# Patient Record
Sex: MALE | Race: WHITE | NOT HISPANIC OR LATINO | URBAN - NONMETROPOLITAN AREA
[De-identification: names, ages, dates, MRNs, and addresses within clinical notes are randomized per-mention and may not be internally consistent; named-entity substitution may affect disease eponyms.]

---

## 2016-09-29 PROBLEM — H02.834: Noted: 2019-05-20

## 2016-09-29 PROBLEM — H02.831: Noted: 2019-05-20

## 2016-09-29 PROBLEM — H18.51: Noted: 2019-05-20

## 2016-09-29 PROBLEM — H35.371: Noted: 2019-05-20

## 2017-09-07 PROBLEM — H01.021: Noted: 2018-09-18

## 2017-09-07 PROBLEM — H18.51: Noted: 2017-09-07

## 2019-01-21 ENCOUNTER — IMPORTED ENCOUNTER (OUTPATIENT)
Dept: URBAN - NONMETROPOLITAN AREA CLINIC 1 | Facility: CLINIC | Age: 74
End: 2019-01-21

## 2019-01-21 PROCEDURE — 92083 EXTENDED VISUAL FIELD XM: CPT

## 2019-01-21 PROCEDURE — 99213 OFFICE O/P EST LOW 20 MIN: CPT

## 2019-01-21 NOTE — PATIENT DISCUSSION
POAG OU- Discussed diagnosis in detail with patient- IOP today 20 OU- Cup to Disc noted at OD . 45 and OS . 39- OCT done previously shows no progression of RNF loss by GPA- VF done today Good field normal with no defects OU- Gonio done previously Baseline Grade IV with light pigment- Optos done previously stable OU - iStents not visable on todays exam- D/C  Xalatan QHS OU continue off of drops at this time. Will continue to monitor. If any change in pressure or OCT will consider putting back on drops - Continue to monitor  Pseudophakia OU- Discussed diagnosis in detail with patient- Doing well s/ p PC IOL OU- Continue to monitorPVD OU- Discussed findings of exam in detail with the patient. - The risk of retinal detachment in patients with PVDs was discussed with the patient and the warning signs of retinal detachment were carefully reviewed with the patient. - The patient was warned to return to the office or contact the ophthalmologist on call immediately if they experience signs of retinal detachment. - Continue to monitorDermatochalsis OU- Discussed diagnosis in detail with patient- No superior field of vision loss- Continue to monitorBlepharitis OU- Discussed diagnosis in detail with patient- Recommend patient using J & J baby shampoo to scrub lid daily- Continue to monitorDES / Guttata OU- Discussed diagnosis in detail with patient- Recommend drinking plenty of water and starting Omega 3's - Recommend Refresh or systane through out the day- Continue to monitor; 's Notes: OCT 5/10/18gonio 5/10/18VF 1/21/19Optos 9/18/18

## 2019-05-20 ENCOUNTER — IMPORTED ENCOUNTER (OUTPATIENT)
Dept: URBAN - NONMETROPOLITAN AREA CLINIC 1 | Facility: CLINIC | Age: 74
End: 2019-05-20

## 2019-05-20 PROCEDURE — 92014 COMPRE OPH EXAM EST PT 1/>: CPT

## 2019-05-20 PROCEDURE — 92133 CPTRZD OPH DX IMG PST SGM ON: CPT

## 2019-05-20 NOTE — PATIENT DISCUSSION
POAG OU- Discussed diagnosis in detail with patient- IOP today 17 OD and 16 OS- Cup to Disc noted at OD 0.45 and OS 0.45- OCT done previously shows no progression of RNF loss by GPA- VF done today Good field normal with no defects OU- Gonio done previously Baseline Grade IV with light pigment- Optos done previously stable OU - iStents visible and stable on exam today- D/C  Xalatan QHS OU continue off of drops at this time. Will continue to monitor. If any change in pressure or OCT will consider putting back on drops - Continue to monitor  Pseudophakia OU- Discussed diagnosis in detail with patient- Doing well s/ p PC IOL OU- MR done today due to mild [facility] complaint today new GLRx given but not required to use. - Currently using +3.50 OTC bifocal readers and is happy with [facility]. - Continue to monitorERM OD-  Discussed findings w/ pt today-  New on exam today-  AG given and instructions on use. Call or come in ASAP if changes in vision are noted from today. -  Continue to monitor PRN-  Will get baseline OCT of macula next visit. PVD OU- Discussed findings of exam in detail with the patient. - The risk of retinal detachment in patients with PVDs was discussed with the patient and the warning signs of retinal detachment were carefully reviewed with the patient. - The patient was warned to return to the office or contact the ophthalmologist on call immediately if they experience signs of retinal detachment. - Continue to monitorDermatochalsis OU- Discussed diagnosis in detail with patient- No superior field of vision loss- Continue to monitorBlepharitis OU- Discussed diagnosis in detail with patient- Recommend patient using J & J baby shampoo to scrub lid daily reminded him of importance again today- Continue to monitorDES / Guttata OU- Discussed diagnosis in detail with patient- Recommend drinking plenty of water and starting Omega 3's - Recommend Refresh or systane through out the day- Continue to monitor; 's Notes:  - AOA (LRI OU)DFE OCT disc 5/20/19OCT mac Gonio 5/10/18VF 1/21/19Optos 9/18/18

## 2019-09-20 ENCOUNTER — IMPORTED ENCOUNTER (OUTPATIENT)
Dept: URBAN - NONMETROPOLITAN AREA CLINIC 1 | Facility: CLINIC | Age: 74
End: 2019-09-20

## 2019-09-20 PROBLEM — H02.831: Noted: 2019-05-20

## 2019-09-20 PROBLEM — H18.51: Noted: 2019-09-20

## 2019-09-20 PROBLEM — H40.1131: Noted: 2020-01-10

## 2019-09-20 PROBLEM — H43.813: Noted: 2019-09-20

## 2019-09-20 PROBLEM — Z96.1: Noted: 2019-09-20

## 2019-09-20 PROBLEM — H02.834: Noted: 2019-05-20

## 2019-09-20 PROBLEM — H35.371: Noted: 2020-01-10

## 2019-09-20 PROBLEM — H40.1131: Noted: 2019-09-20

## 2019-09-20 PROBLEM — H18.513: Noted: 2021-07-20

## 2019-09-20 PROBLEM — H35.371: Noted: 2019-09-20

## 2019-09-20 PROCEDURE — 92014 COMPRE OPH EXAM EST PT 1/>: CPT

## 2019-09-20 PROCEDURE — 92134 CPTRZ OPH DX IMG PST SGM RTA: CPT

## 2019-09-20 NOTE — PATIENT DISCUSSION
ERM OD - Discussed diagnosis in detail with patient- Recommend patient start following the [address] patient to call or come into the office ASAP if any changes noted from today- OCT done today shows OD ERM and OS WNL - Continue to monitor - RTC 4 months F/U POAG with Optos and GonioPOAG OU- Discussed diagnosis in detail with patient- IOP today 18 OU- Cup to Disc noted at OD 0.45 and OS 0.45- OCT done previously shows no progression of RNF loss by GPA- VF done previously Good field normal with no defects OU- Gonio done previously Baseline Grade IV with light pigment- Optos done previously stable OU - iStents visible and stable on exam today- D/C  Xalatan QHS OU continue off of drops at this time. Will continue to monitor. If any change in pressure or OCT will consider putting back on drops. Pressures are up today but patient does not want to start back on drops at this time - Continue to monitor  Pseudophakia OU- Discussed diagnosis in detail with patient- S/P YAG PC OU - Good central opening - Currently using +3.50 OTC bifocal readers and is happy with [facility]. - Continue to monitorPVD OU- Discussed findings of exam in detail with the patient. - The risk of retinal detachment in patients with PVDs was discussed with the patient and the warning signs of retinal detachment were carefully reviewed with the patient. - The patient was warned to return to the office or contact the ophthalmologist on call immediately if they experience signs of retinal detachment. - Continue to monitorDermatochalsis OU- Discussed diagnosis in detail with patient- No superior field of vision loss- Continue to monitorBlepharitis OU- Discussed diagnosis in detail with patient- Recommend patient using J [employer] J baby shampoo to scrub lid daily reminded him of importance again today- Continue to monitorDES / Guttata OU- Discussed diagnosis in detail with patient- Recommend drinking plenty of water and starting Omega 3's - Recommend Refresh or systane through out the day- Continue to monitor; 's Notes: MR - AOA (LRI OU)DFE OCT disc 5/20/19OCT mac 9/20/19Gonio 5/10/18VF 1/21/19Optos 9/18/18

## 2020-01-10 ENCOUNTER — IMPORTED ENCOUNTER (OUTPATIENT)
Dept: URBAN - NONMETROPOLITAN AREA CLINIC 1 | Facility: CLINIC | Age: 75
End: 2020-01-10

## 2020-01-10 PROCEDURE — 92250 FUNDUS PHOTOGRAPHY W/I&R: CPT

## 2020-01-10 PROCEDURE — 92014 COMPRE OPH EXAM EST PT 1/>: CPT

## 2020-01-10 PROCEDURE — 92020 GONIOSCOPY: CPT

## 2020-01-10 NOTE — PATIENT DISCUSSION
ERM OD - Discussed diagnosis in detail with patient- Recommend patient start following the [address] patient to call or come into the office ASAP if any changes noted from today- OCT done today shows OD ERM and OS WNL - Continue to monitor - RTC 4 months F/U POAG with Optos and GonioPOAG OU- Discussed diagnosis in detail with patient- IOP today 18 OD and 19 OS. - Cup to Disc noted at OD 0.45 and OS 0.45- OCT done previously shows no progression of RNF loss by GPA- VF done previously Good field normal with no defects OU- Gonio done today: Grade IV with light pigment stable OU. - Optos done today:  Stable OU - iStents visible and stable on exam today- Continue without Xalatan QHS OU will continue to monitor. If any change in pressure or OCT will consider putting back on drops. Pressures are up today but patient does not want to start back on drops at this time. - Continue to monitor  Pseudophakia OU- Discussed diagnosis in detail with patient- S/P YAG PC OU - Good central opening - Currently using +3.50 OTC bifocal readers and is happy with [facility]. - Continue to monitorPVD OU- Discussed findings of exam in detail with the patient. - The risk of retinal detachment in patients with PVDs was discussed with the patient and the warning signs of retinal detachment were carefully reviewed with the patient. - The patient was warned to return to the office or contact the ophthalmologist on call immediately if they experience signs of retinal detachment. - Continue to monitorDermatochalsis OU- Discussed diagnosis in detail with patient- No superior field of vision loss- Continue to monitorBlepharitis OU- Discussed diagnosis in detail with patient- Recommend patient using J [employer] J baby shampoo to scrub lid daily reminded him of importance again today- Continue to monitorDES / Guttata OU- Discussed diagnosis in detail with patient- Recommend drinking plenty of water and starting Omega 3's - Recommend Refresh or systane through out the day- Continue to monitor; 's Notes: MR - AOA (LRI OU)DFE OCT disc 5/20/19OCT mac 9/20/19VF 1/21/19Gonio 1/10/20Optos 1/10/20

## 2020-05-12 ENCOUNTER — IMPORTED ENCOUNTER (OUTPATIENT)
Dept: URBAN - NONMETROPOLITAN AREA CLINIC 1 | Facility: CLINIC | Age: 75
End: 2020-05-12

## 2020-05-12 PROCEDURE — 99213 OFFICE O/P EST LOW 20 MIN: CPT

## 2020-05-12 NOTE — PATIENT DISCUSSION
POAG OU- Discussed diagnosis in detail with patient- IOP today 18 OU- Cup to Disc noted at OD 0.45 and OS 0.45- OCT done previously shows no progression of RNF loss by GPA- VF done previously Good field normal with no defects OU- Gonio done previously Grade IV with light pigment stable OU. - Optos done previously Stable OU - iStents visible and stable on exam today- Continue without Xalatan QHS OU will continue to monitor. If any change in pressure or OCT will consider putting back on drops. Pressures are up today but patient does not want to start back on drops at this time. - Continue to monitorERM OD - Discussed diagnosis in detail with patient- Recommend patient start following the [address] patient to call or come into the office ASAP if any changes noted from today- OCT done previously shows OD ERM and OS WNL - Continue to monitor Pseudophakia OU- Discussed diagnosis in detail with patient- S/P YAG PC OU - Good central opening - Currently using +3.50 OTC bifocal readers and is happy with [facility]. - Continue to monitorPVD OU- Discussed findings of exam in detail with the patient. - The risk of retinal detachment in patients with PVDs was discussed with the patient and the warning signs of retinal detachment were carefully reviewed with the patient. - The patient was warned to return to the office or contact the ophthalmologist on call immediately if they experience signs of retinal detachment. - Continue to monitorDermatochalsis OU- Discussed diagnosis in detail with patient- No superior field of vision loss- Continue to monitorBlepharitis OU- Discussed diagnosis in detail with patient- Recommend patient using J & J baby shampoo to scrub lid daily reminded him of importance again today- Continue to monitorDES / Guttata OU- Discussed diagnosis in detail with patient- Recommend drinking plenty of water and starting Omega 3's - Recommend Refresh or systane through out the day- Continue to monitor; 's Notes: MR - AOA (LRI OU)DFE OCT disc 5/20/19OCT mac 9/20/19VF 1/21/19Gonio 1/10/20Optos 1/10/20

## 2020-09-15 ENCOUNTER — IMPORTED ENCOUNTER (OUTPATIENT)
Dept: URBAN - NONMETROPOLITAN AREA CLINIC 1 | Facility: CLINIC | Age: 75
End: 2020-09-15

## 2020-09-15 PROCEDURE — 99213 OFFICE O/P EST LOW 20 MIN: CPT

## 2020-09-15 PROCEDURE — 92083 EXTENDED VISUAL FIELD XM: CPT

## 2020-09-15 NOTE — PATIENT DISCUSSION
POAG OU- Discussed diagnosis in detail with patient- IOP today 18 OU- Cup to Disc noted at OD 0.45 and OS 0.45- OCT done previously shows no progression of RNF loss by GPA- VF done today OD shows Fair field with Borderline Nasal Superior Arcuate and OS shows Fair field and Normal - Gonio done previously Grade IV with light pigment stable OU. - Optos done previously Stable OU - iStents not visible on exam today- Continue without Xalatan QHS OU will continue to monitor. If any change in pressure or OCT will consider putting back on drops. - Continue to monitorERM OD - Discussed diagnosis in detail with patient- Recommend patient start following the [address] patient to call or come into the office ASAP if any changes noted from today- OCT done previously shows OD ERM and OS WNL - Continue to monitor Pseudophakia OU- Discussed diagnosis in detail with patient- S/P YAG PC OU - Good central opening - Currently using +3.50 OTC bifocal readers and is happy with [facility]. - Continue to monitorPVD OU- Discussed findings of exam in detail with the patient. - The risk of retinal detachment in patients with PVDs was discussed with the patient and the warning signs of retinal detachment were carefully reviewed with the patient. - The patient was warned to return to the office or contact the ophthalmologist on call immediately if they experience signs of retinal detachment. - Continue to monitorDermatochalsis OU- Discussed diagnosis in detail with patient- No superior field of vision loss- Continue to monitorBlepharitis OU- Discussed diagnosis in detail with patient- Recommend patient using J [employer] J baby shampoo to scrub lid daily reminded him of importance again today- Continue to monitorDES / Guttata OU- Discussed diagnosis in detail with patient- Recommend drinking plenty of water and starting Omega 3's - Recommend Refresh or systane through out the day- Continue to monitor; 's Notes:  - AOA (LRI OU)DFE OCT disc 5/20/19OCT mac 9/20/19VF 9/15/20Gonio 1/10/20Optos 1/10/20

## 2021-01-15 ENCOUNTER — IMPORTED ENCOUNTER (OUTPATIENT)
Dept: URBAN - NONMETROPOLITAN AREA CLINIC 1 | Facility: CLINIC | Age: 76
End: 2021-01-15

## 2021-01-15 PROCEDURE — 92133 CPTRZD OPH DX IMG PST SGM ON: CPT

## 2021-01-15 PROCEDURE — 92014 COMPRE OPH EXAM EST PT 1/>: CPT

## 2021-01-15 NOTE — PATIENT DISCUSSION
POAG OU- Discussed diagnosis in detail with patient- IOP today 17 OU- Cup to Disc noted at OD 0.45 and OS 0.45- OCT done today shows no progression of RNF loss by GPA- VF done previously OD shows Fair field with Borderline Nasal Superior Arcuate and OS shows Fair field and Normal - Gonio done previously Grade IV with light pigment stable OU. - Optos done previously Stable OU - iStents not visible on exam today- Continue without Xalatan QHS OU will continue to monitor. If any change in pressure or OCT will consider putting back on drops. - Continue to monitorERM OD - Discussed diagnosis in detail with patient- Recommend patient start following the [address] patient to call or come into the office ASAP if any changes noted from today- OCT done previously shows OD ERM and OS WNL - Continue to monitor Pseudophakia OU- Discussed diagnosis in detail with patient- S/P YAG PC OU - Good central opening - Currently using +3.50 OTC bifocal readers and is happy with [facility]. - Continue to monitorPVD OU- Discussed findings of exam in detail with the patient. - The risk of retinal detachment in patients with PVDs was discussed with the patient and the warning signs of retinal detachment were carefully reviewed with the patient. - The patient was warned to return to the office or contact the ophthalmologist on call immediately if they experience signs of retinal detachment. - Continue to monitorDermatochalsis OU- Discussed diagnosis in detail with patient- No superior field of vision loss- Continue to monitorBlepharitis OU- Discussed diagnosis in detail with patient- Recommend patient using J [employer] J baby shampoo to scrub lid daily reminded him of importance again today- Continue to monitorDES / Guttata OU- Discussed diagnosis in detail with patient- Recommend drinking plenty of water and starting Omega 3's - Recommend Refresh or systane through out the day- Continue to monitor; 's Notes:  - AOA (LRI OU)DFE OCT disc 1/15/21OCT mac 9/20/19VF 9/15/20Gonio 1/10/20Optos 1/10/20

## 2021-07-20 ENCOUNTER — IMPORTED ENCOUNTER (OUTPATIENT)
Dept: URBAN - NONMETROPOLITAN AREA CLINIC 1 | Facility: CLINIC | Age: 76
End: 2021-07-20

## 2021-07-20 PROCEDURE — 99214 OFFICE O/P EST MOD 30 MIN: CPT

## 2021-07-20 PROCEDURE — 92083 EXTENDED VISUAL FIELD XM: CPT

## 2021-07-20 NOTE — PATIENT DISCUSSION
POAG OU- Discussed diagnosis in detail with patient- IOP today 16 OU- Cup to Disc noted at OD 0.45 and OS 0.45- OCT done previously shows no progression of RNF loss by GPA- VF done today OD shows Poor field with Non Specific Defects and Os shows Fair field with Inferior Arcuate  - Gonio done previously Grade IV with light pigment stable OU. - Optos done previously Stable OU - iStents not visible on exam today- Continue without Xalatan QHS OU will continue to monitor. If any change in pressure or OCT will consider putting back on drops. - Continue to monitorERM OD - Discussed diagnosis in detail with patient- Recommend patient start following the [address] patient to call or come into the office ASAP if any changes noted from today- OCT done previously shows OD ERM and OS WNL - Continue to monitor Pseudophakia OU- Discussed diagnosis in detail with patient- S/P YAG PC OU - Good central opening - Currently using +3.50 OTC bifocal readers and is happy with [facility]. - Continue to monitorPVD OU- Discussed findings of exam in detail with the patient. - The risk of retinal detachment in patients with PVDs was discussed with the patient and the warning signs of retinal detachment were carefully reviewed with the patient. - The patient was warned to return to the office or contact the ophthalmologist on call immediately if they experience signs of retinal detachment. - Continue to monitorDermatochalsis OU- Discussed diagnosis in detail with patient- No superior field of vision loss- Continue to monitorBlepharitis OU- Discussed diagnosis in detail with patient- Recommend patient using J [employer] J baby shampoo to scrub lid daily reminded him of importance again today- Continue to monitorDES / Guttata OU- Discussed diagnosis in detail with patient- Recommend drinking plenty of water and starting Omega 3's - Recommend Refresh or systane through out the day- Continue to monitorPresbyopia OU - Discussed diagnosis in detail with patient - New glasses RX given today - Continue to monitor; 's Notes: MR - AOA (LRI OU)DFE OCT disc 1/15/21OCT mac 9/20/19VF 7/20/21Gonio 1/10/20Optos 1/10/20

## 2022-03-01 ASSESSMENT — VISUAL ACUITY
OD_PH: 20/30-2
OD_SC: 20/60
OS_SC: 20/30-2

## 2022-03-03 ENCOUNTER — PREPPED CHART (OUTPATIENT)
Dept: URBAN - NONMETROPOLITAN AREA CLINIC 1 | Facility: CLINIC | Age: 77
End: 2022-03-03

## 2022-03-03 ENCOUNTER — FOLLOW UP (OUTPATIENT)
Dept: URBAN - NONMETROPOLITAN AREA CLINIC 1 | Facility: CLINIC | Age: 77
End: 2022-03-03

## 2022-03-03 DIAGNOSIS — H40.1131: ICD-10-CM

## 2022-03-03 DIAGNOSIS — H35.371: ICD-10-CM

## 2022-03-03 PROCEDURE — 92133 CPTRZD OPH DX IMG PST SGM ON: CPT

## 2022-03-03 PROCEDURE — 99214 OFFICE O/P EST MOD 30 MIN: CPT

## 2022-03-03 ASSESSMENT — VISUAL ACUITY
OS_CC: 20/20
OD_CC: 20/25

## 2022-03-03 ASSESSMENT — TONOMETRY
OS_IOP_MMHG: 17
OD_IOP_MMHG: 17

## 2022-04-15 ASSESSMENT — VISUAL ACUITY
OS_CC: 20/29
OS_CC: 20/30-1
OD_PH: 20/25
OD_CC: 20/40+1
OS_CC: 20/30-2
OD_CC: 20/40-
OD_CC: 20/50
OD_CC: 20/60
OD_PH: 20/32
OD_PH: 20/25
OD_CC: 20/40-
OS_CC: 20/25-
OU_CC: 20/20-1
OD_PH: 20/30
OD_PH: 20/22-
OD_CC: 20/40-
OD_CC: 20/40-2
OS_CC: 20/20-1
OS_CC: 20/22+2
OS_PH: 20/25
OD_PH: 20/30-2
OD_PH: 20/29
OS_CC: 20/40-
OD_CC: 20/29+
OS_CC: 20/30

## 2022-04-15 ASSESSMENT — TONOMETRY
OS_IOP_MMHG: 17
OS_IOP_MMHG: 19
OD_IOP_MMHG: 16
OD_IOP_MMHG: 17
OD_IOP_MMHG: 18
OD_IOP_MMHG: 17
OS_IOP_MMHG: 18
OS_IOP_MMHG: 18
OD_IOP_MMHG: 18
OS_IOP_MMHG: 18
OD_IOP_MMHG: 20
OS_IOP_MMHG: 16
OS_IOP_MMHG: 20
OS_IOP_MMHG: 16
OD_IOP_MMHG: 20
OD_IOP_MMHG: 18

## 2022-04-15 ASSESSMENT — PACHYMETRY
OD_CT_UM: 532; ADJ: THIN
OD_CT_UM: 532; ADJ: THIN
OS_CT_UM: 538; ADJ: THIN
OD_CT_UM: 532; ADJ: THIN
OS_CT_UM: 538; ADJ: THIN
OD_CT_UM: 532; ADJ: THIN
OD_CT_UM: 532; ADJ: THIN
OS_CT_UM: 538; ADJ: THIN
OD_CT_UM: 532; ADJ: THIN
OS_CT_UM: 538; ADJ: THIN
OS_CT_UM: 538; ADJ: THIN
OD_CT_UM: 532; ADJ: THIN
OD_CT_UM: 532; ADJ: THIN

## 2022-09-08 ENCOUNTER — ESTABLISHED PATIENT (OUTPATIENT)
Dept: URBAN - NONMETROPOLITAN AREA CLINIC 1 | Facility: CLINIC | Age: 77
End: 2022-09-08

## 2022-09-08 DIAGNOSIS — H52.4: ICD-10-CM

## 2022-09-08 DIAGNOSIS — H40.1131: ICD-10-CM

## 2022-09-08 PROCEDURE — 92015 DETERMINE REFRACTIVE STATE: CPT

## 2022-09-08 PROCEDURE — 92014 COMPRE OPH EXAM EST PT 1/>: CPT

## 2022-09-08 PROCEDURE — 92250 FUNDUS PHOTOGRAPHY W/I&R: CPT

## 2022-09-08 ASSESSMENT — TONOMETRY
OS_IOP_MMHG: 15
OD_IOP_MMHG: 15

## 2022-09-08 ASSESSMENT — VISUAL ACUITY
OD_PH: 20/29
OD_SC: 20/50-1
OS_SC: 20/29-2

## 2023-09-22 ENCOUNTER — ESTABLISHED PATIENT (OUTPATIENT)
Dept: URBAN - NONMETROPOLITAN AREA CLINIC 1 | Facility: CLINIC | Age: 78
End: 2023-09-22

## 2023-09-22 DIAGNOSIS — H40.1131: ICD-10-CM

## 2023-09-22 DIAGNOSIS — H35.371: ICD-10-CM

## 2023-09-22 DIAGNOSIS — H52.4: ICD-10-CM

## 2023-09-22 PROCEDURE — 92015 DETERMINE REFRACTIVE STATE: CPT

## 2023-09-22 PROCEDURE — 92014 COMPRE OPH EXAM EST PT 1/>: CPT

## 2023-09-22 ASSESSMENT — TONOMETRY
OD_IOP_MMHG: 18
OS_IOP_MMHG: 19

## 2023-09-22 ASSESSMENT — VISUAL ACUITY
OD_SC: 20/60
OU_SC: 20/40
OS_PH: 20/30
OS_SC: 20/40
OD_PH: 20/40

## 2024-04-02 ENCOUNTER — FOLLOW UP (OUTPATIENT)
Dept: URBAN - NONMETROPOLITAN AREA CLINIC 1 | Facility: CLINIC | Age: 79
End: 2024-04-02

## 2024-04-02 DIAGNOSIS — H40.1131: ICD-10-CM

## 2024-04-02 PROCEDURE — 99213 OFFICE O/P EST LOW 20 MIN: CPT

## 2024-04-02 PROCEDURE — 92133 CPTRZD OPH DX IMG PST SGM ON: CPT

## 2024-04-02 PROCEDURE — 92083 EXTENDED VISUAL FIELD XM: CPT

## 2024-04-02 ASSESSMENT — VISUAL ACUITY
OD_PH: 20/29
OS_CC: 20/29-1
OD_CC: 20/40

## 2024-04-02 ASSESSMENT — TONOMETRY
OD_IOP_MMHG: 19
OS_IOP_MMHG: 20

## 2024-10-24 ENCOUNTER — COMPREHENSIVE EXAM (OUTPATIENT)
Dept: URBAN - NONMETROPOLITAN AREA CLINIC 1 | Facility: CLINIC | Age: 79
End: 2024-10-24

## 2024-10-24 DIAGNOSIS — H52.4: ICD-10-CM

## 2024-10-24 PROCEDURE — 92014 COMPRE OPH EXAM EST PT 1/>: CPT

## 2024-10-24 PROCEDURE — 92015 DETERMINE REFRACTIVE STATE: CPT

## 2025-04-28 ENCOUNTER — FOLLOW UP (OUTPATIENT)
Age: 80
End: 2025-04-28

## 2025-04-28 DIAGNOSIS — H35.371: ICD-10-CM

## 2025-04-28 DIAGNOSIS — H40.1131: ICD-10-CM

## 2025-04-28 DIAGNOSIS — H43.813: ICD-10-CM

## 2025-04-28 PROCEDURE — 92133 CPTRZD OPH DX IMG PST SGM ON: CPT

## 2025-04-28 PROCEDURE — 99213 OFFICE O/P EST LOW 20 MIN: CPT

## 2025-04-28 PROCEDURE — 92134 CPTRZ OPH DX IMG PST SGM RTA: CPT | Mod: NC
